# Patient Record
Sex: FEMALE | Race: WHITE | URBAN - METROPOLITAN AREA
[De-identification: names, ages, dates, MRNs, and addresses within clinical notes are randomized per-mention and may not be internally consistent; named-entity substitution may affect disease eponyms.]

---

## 2018-05-18 ENCOUNTER — TELEPHONE (OUTPATIENT)
Dept: INTERNAL MEDICINE | Age: 63
End: 2018-05-18

## 2018-05-25 ENCOUNTER — OFFICE VISIT (OUTPATIENT)
Dept: INTERNAL MEDICINE | Age: 63
End: 2018-05-25

## 2018-05-25 VITALS
HEIGHT: 66 IN | SYSTOLIC BLOOD PRESSURE: 120 MMHG | HEART RATE: 70 BPM | TEMPERATURE: 98.4 F | OXYGEN SATURATION: 100 % | RESPIRATION RATE: 16 BRPM | DIASTOLIC BLOOD PRESSURE: 78 MMHG | WEIGHT: 186.6 LBS | BODY MASS INDEX: 29.99 KG/M2

## 2018-05-25 DIAGNOSIS — Z01.818 PREOP EXAMINATION: Primary | ICD-10-CM

## 2018-05-25 DIAGNOSIS — H25.9 AGE-RELATED CATARACT OF BOTH EYES, UNSPECIFIED AGE-RELATED CATARACT TYPE: ICD-10-CM

## 2018-05-25 PROCEDURE — 99243 OFF/OP CNSLTJ NEW/EST LOW 30: CPT | Performed by: NURSE PRACTITIONER

## 2018-05-25 ASSESSMENT — ENCOUNTER SYMPTOMS
WHEEZING: 0
PHOTOPHOBIA: 0
ORTHOPNEA: 0
VOMITING: 0
NAUSEA: 0
BACK PAIN: 0
HEARTBURN: 0
HEMOPTYSIS: 0
BLOOD IN STOOL: 0
EYE REDNESS: 0
CONSTIPATION: 0
EYE DISCHARGE: 0
SORE THROAT: 0
ABDOMINAL PAIN: 0
EYE PAIN: 0
SPUTUM PRODUCTION: 0
DIARRHEA: 0
STRIDOR: 0
SINUS PAIN: 0
BLURRED VISION: 0
COUGH: 0
DOUBLE VISION: 0
SHORTNESS OF BREATH: 0

## 2019-09-11 LAB — PAP SMEAR: NORMAL

## 2020-03-03 ENCOUNTER — TELEPHONE (OUTPATIENT)
Dept: INTERNAL MEDICINE CLINIC | Age: 65
End: 2020-03-03

## 2020-05-05 ENCOUNTER — TELEPHONE (OUTPATIENT)
Dept: INTERNAL MEDICINE CLINIC | Age: 65
End: 2020-05-05

## 2020-05-05 NOTE — TELEPHONE ENCOUNTER
She has not seen me since 2012  Needs to have a physical  Can schedule with me at [facility] and can do preop at the same time

## 2020-05-05 NOTE — TELEPHONE ENCOUNTER
Pt calling because she is having knee replacement on 5/28/2020 and [facility] needs her to have pre-op visit first ??

## 2020-05-20 ENCOUNTER — OFFICE VISIT (OUTPATIENT)
Dept: FAMILY MEDICINE CLINIC | Age: 65
End: 2020-05-20
Payer: COMMERCIAL

## 2020-05-20 VITALS
OXYGEN SATURATION: 99 % | WEIGHT: 192 LBS | HEART RATE: 67 BPM | HEIGHT: 66 IN | RESPIRATION RATE: 12 BRPM | BODY MASS INDEX: 30.86 KG/M2 | DIASTOLIC BLOOD PRESSURE: 63 MMHG | SYSTOLIC BLOOD PRESSURE: 147 MMHG | TEMPERATURE: 97.9 F

## 2020-05-20 PROBLEM — M17.11 PRIMARY OSTEOARTHRITIS OF RIGHT KNEE: Status: ACTIVE | Noted: 2020-05-20

## 2020-05-20 PROBLEM — R03.0 ELEVATED BLOOD-PRESSURE READING WITHOUT DIAGNOSIS OF HYPERTENSION: Status: ACTIVE | Noted: 2020-05-20

## 2020-05-20 PROCEDURE — 93000 ELECTROCARDIOGRAM COMPLETE: CPT | Performed by: INTERNAL MEDICINE

## 2020-05-20 PROCEDURE — 99214 OFFICE O/P EST MOD 30 MIN: CPT | Performed by: INTERNAL MEDICINE

## 2020-05-20 ASSESSMENT — PATIENT HEALTH QUESTIONNAIRE - PHQ9
2. FEELING DOWN, DEPRESSED OR HOPELESS: 0
SUM OF ALL RESPONSES TO PHQ QUESTIONS 1-9: 0
1. LITTLE INTEREST OR PLEASURE IN DOING THINGS: 0
SUM OF ALL RESPONSES TO PHQ QUESTIONS 1-9: 0
SUM OF ALL RESPONSES TO PHQ9 QUESTIONS 1 & 2: 0

## 2020-05-20 ASSESSMENT — ENCOUNTER SYMPTOMS
BLOOD IN STOOL: 0
ABDOMINAL DISTENTION: 0
APNEA: 0
STRIDOR: 0
RECTAL PAIN: 0
COLOR CHANGE: 0
EYE PAIN: 0
EYE DISCHARGE: 0
SINUS PRESSURE: 0
ABDOMINAL PAIN: 0
EYE REDNESS: 0
VOMITING: 0
SORE THROAT: 0
TROUBLE SWALLOWING: 0
DIARRHEA: 0
BACK PAIN: 0
SHORTNESS OF BREATH: 0
PHOTOPHOBIA: 0
FACIAL SWELLING: 0
EYE ITCHING: 0
NAUSEA: 0
CHOKING: 0
RHINORRHEA: 0
CHEST TIGHTNESS: 0
CONSTIPATION: 0
VOICE CHANGE: 0
ANAL BLEEDING: 0
WHEEZING: 0
COUGH: 0

## 2020-05-20 NOTE — ASSESSMENT & PLAN NOTE
BP has been good  Monitor mera-operatively  Continue exercise  Watch salt in diet  BP check one month

## 2020-05-22 ENCOUNTER — TELEPHONE (OUTPATIENT)
Dept: INTERNAL MEDICINE CLINIC | Age: 65
End: 2020-05-22

## 2020-05-22 NOTE — TELEPHONE ENCOUNTER
[facility] calling for pre-op office notes and testing and EKG. Please send to fax number [phone]. Pt's surgery is 5/28.

## 2020-05-26 ENCOUNTER — TELEPHONE (OUTPATIENT)
Dept: INTERNAL MEDICINE CLINIC | Age: 65
End: 2020-05-26

## 2020-05-27 ENCOUNTER — TELEPHONE (OUTPATIENT)
Dept: INTERNAL MEDICINE CLINIC | Age: 65
End: 2020-05-27

## 2020-05-27 NOTE — TELEPHONE ENCOUNTER
Pt is at [facility] now to have pre-op bloodwork and urine test done. Lab calling because they need new orders with dx codes and actual MD signature faxed to [phone].

## 2020-05-27 NOTE — TELEPHONE ENCOUNTER
Information is digitally signed and noted, \"patient will do well with planned procedure\" per Dr. Raya Rosas. Labs still say Active, so there is nothing in [facility] that says those are completed unless she had done at an outside lab.

## 2021-04-29 ENCOUNTER — TELEPHONE (OUTPATIENT)
Dept: SURGERY | Age: 66
End: 2021-04-29